# Patient Record
Sex: FEMALE | Race: WHITE | Employment: FULL TIME | URBAN - METROPOLITAN AREA
[De-identification: names, ages, dates, MRNs, and addresses within clinical notes are randomized per-mention and may not be internally consistent; named-entity substitution may affect disease eponyms.]

---

## 2024-02-14 ENCOUNTER — INITIAL PRENATAL (OUTPATIENT)
Dept: OBGYN CLINIC | Age: 34
End: 2024-02-14
Payer: COMMERCIAL

## 2024-02-14 ENCOUNTER — ANCILLARY PROCEDURE (OUTPATIENT)
Dept: OBGYN CLINIC | Age: 34
End: 2024-02-14
Payer: COMMERCIAL

## 2024-02-14 VITALS
WEIGHT: 185 LBS | BODY MASS INDEX: 29.03 KG/M2 | HEIGHT: 67 IN | DIASTOLIC BLOOD PRESSURE: 78 MMHG | OXYGEN SATURATION: 97 % | HEART RATE: 78 BPM | SYSTOLIC BLOOD PRESSURE: 124 MMHG

## 2024-02-14 DIAGNOSIS — Z03.75 SUSPECTED SHORTENING OF CERVIX NOT FOUND: ICD-10-CM

## 2024-02-14 DIAGNOSIS — Z83.2 FAMILY HISTORY OF ITP: ICD-10-CM

## 2024-02-14 DIAGNOSIS — Z36.3 ENCOUNTER FOR ANTENATAL SCREENING FOR MALFORMATION USING ULTRASOUND: Primary | ICD-10-CM

## 2024-02-14 DIAGNOSIS — O99.330 TOBACCO USE DISORDER AFFECTING PREGNANCY, ANTEPARTUM: ICD-10-CM

## 2024-02-14 DIAGNOSIS — E78.2 MIXED HYPERLIPIDEMIA: ICD-10-CM

## 2024-02-14 DIAGNOSIS — O34.10 UTERINE FIBROID DURING PREGNANCY, ANTEPARTUM: ICD-10-CM

## 2024-02-14 DIAGNOSIS — Z34.90 PREGNANCY, UNSPECIFIED GESTATIONAL AGE: ICD-10-CM

## 2024-02-14 DIAGNOSIS — D25.9 UTERINE FIBROID DURING PREGNANCY, ANTEPARTUM: ICD-10-CM

## 2024-02-14 LAB
GLUCOSE URINE, POC: NEGATIVE
PROTEIN UA: POSITIVE

## 2024-02-14 PROCEDURE — 81002 URINALYSIS NONAUTO W/O SCOPE: CPT | Performed by: OBSTETRICS & GYNECOLOGY

## 2024-02-14 PROCEDURE — 76811 OB US DETAILED SNGL FETUS: CPT | Performed by: OBSTETRICS & GYNECOLOGY

## 2024-02-14 PROCEDURE — 99204 OFFICE O/P NEW MOD 45 MIN: CPT | Performed by: OBSTETRICS & GYNECOLOGY

## 2024-02-14 PROCEDURE — 76817 TRANSVAGINAL US OBSTETRIC: CPT | Performed by: OBSTETRICS & GYNECOLOGY

## 2024-02-14 PROCEDURE — 99203 OFFICE O/P NEW LOW 30 MIN: CPT | Performed by: OBSTETRICS & GYNECOLOGY

## 2024-02-14 NOTE — PATIENT INSTRUCTIONS
Please arrive for your scheduled appointment at least 15 minutes early with your actual insurance card+ a photo ID. Also if you need any refills ordered or have questions, it may take up 48 hours to reply. Please allow ample time for your refills. Call me when you use last refill. Thank you for your cooperation. You might be having an NST at your next appt. Please eat a large snack or breakfast before coming to office. Thank youCall your primary obstetrician with bleeding, leaking of fluid, abdominal tenderness, headache, blurry vision, epigastric pain and increased urinary frequency.Any questions contact Khadra at [phone].If you are experiencing an emergency and need immediate help, call 911 or go to go emergency room or labor and delivery. If you are experiencing an emergency and need immediate help, call 911 or go to go emergency room or labor and delivery. If you are experiencing an emergency and need immediate help, call 911 or go to go emergency room or labor and delivery.

## 2024-02-14 NOTE — PROGRESS NOTES
Patient here for prenatal ultrasound  No complaints  + fluttering noted  
blood and blood forming organ     History of ITP     Mixed hyperlipidemia 2022       Past Surgical History:   Procedure Laterality Date    COLONOSCOPY      dr. hendrickson       Allergies   Allergen Reactions    Ibuprofen      Bruising         Current Outpatient Medications:     Prenatal MV-Min-Fe Fum-FA-DHA (PRENATAL 1 PO), Take 1 tablet by mouth daily, Disp: , Rfl:     Social History     Tobacco Use    Smoking status: Every Day     Current packs/day: 0.00     Types: Cigarettes     Last attempt to quit: 2/10/2020     Years since quittin.0    Smokeless tobacco: Never    Tobacco comments:     3,4 cigaretets a day   Substance Use Topics    Alcohol use: Not Currently     Alcohol/week: 0.0 standard drinks of alcohol     Comment: social     FAMILY MEDICAL HISTORY:   Negative for congenital abnormalities, autism, genetic disease and mental retardation, not listed above.     Review of Systems :   CONSTITUTIONAL : No fever, no chills   HEENT : No headache, no visual changes, no rhinorrhea, no sore throat   CARDIOVASCULAR : No pain, no palpitations, no edema   RESPIRATORY : No pain, no shortness of breath   GASTROINTESTINAL : No N/V, no D/C, no abdominal pain   GENITOURINARY : No dysuria, hematuria and no incontinence   MUSCULOSKELETAL : No myalgia, No back pain  NEUROLOGICAL : No numbness, no tingling, no tremors. No history of seizures  ALL OTHER SYSTEMS WERE REPORTED AS NEGATIVE.    PERTINENT PHYSICAL EXAMINATION:   /78 (Position: Sitting)   Pulse 78   Ht 1.702 m (5' 7\")   Wt 83.9 kg (185 lb)   LMP 2023   SpO2 97%   BMI 28.98 kg/m²   Urine dipstick:   Negative for Glucose    Trace for Albumin    GENERAL:   The patient is a well developed, female who is alert cooperative and oriented times three in no acute distress.    HEENT:  Normo cephalic and atraumatic. No facial edema.     ABDOMEN:   Her uterus is gravid. She had no complaint of abdominal pain or tenderness. The fetal heart rate is 153 bpm.

## 2024-04-22 ENCOUNTER — ROUTINE PRENATAL (OUTPATIENT)
Dept: OBGYN CLINIC | Age: 34
End: 2024-04-22
Payer: COMMERCIAL

## 2024-04-22 ENCOUNTER — ANCILLARY PROCEDURE (OUTPATIENT)
Dept: OBGYN CLINIC | Age: 34
End: 2024-04-22
Payer: COMMERCIAL

## 2024-04-22 VITALS
DIASTOLIC BLOOD PRESSURE: 86 MMHG | SYSTOLIC BLOOD PRESSURE: 117 MMHG | BODY MASS INDEX: 29.66 KG/M2 | HEART RATE: 82 BPM | WEIGHT: 189.4 LBS

## 2024-04-22 DIAGNOSIS — D25.9 UTERINE FIBROID DURING PREGNANCY, ANTEPARTUM: ICD-10-CM

## 2024-04-22 DIAGNOSIS — Z3A.29 29 WEEKS GESTATION OF PREGNANCY: Primary | ICD-10-CM

## 2024-04-22 DIAGNOSIS — O26.899 PREGNANCY COMPLICATED BY UMBILICAL CORD VARIX, ANTEPARTUM: ICD-10-CM

## 2024-04-22 DIAGNOSIS — O34.10 UTERINE FIBROID DURING PREGNANCY, ANTEPARTUM: ICD-10-CM

## 2024-04-22 DIAGNOSIS — O99.330 TOBACCO USE DISORDER AFFECTING PREGNANCY, ANTEPARTUM: ICD-10-CM

## 2024-04-22 DIAGNOSIS — Z83.2 FAMILY HISTORY OF ITP: ICD-10-CM

## 2024-04-22 LAB
GLUCOSE URINE, POC: NEGATIVE
PROTEIN UA: POSITIVE

## 2024-04-22 PROCEDURE — 81002 URINALYSIS NONAUTO W/O SCOPE: CPT | Performed by: OBSTETRICS & GYNECOLOGY

## 2024-04-22 PROCEDURE — 76805 OB US >/= 14 WKS SNGL FETUS: CPT | Performed by: OBSTETRICS & GYNECOLOGY

## 2024-04-22 PROCEDURE — 76818 FETAL BIOPHYS PROFILE W/NST: CPT | Performed by: OBSTETRICS & GYNECOLOGY

## 2024-04-22 PROCEDURE — 99213 OFFICE O/P EST LOW 20 MIN: CPT | Performed by: OBSTETRICS & GYNECOLOGY

## 2024-04-22 RX ORDER — FERROUS SULFATE 325(65) MG
1 TABLET ORAL DAILY
COMMUNITY
Start: 2024-04-12

## 2024-04-22 NOTE — PROGRESS NOTES
24    Charles Molina MD  [address] [place] [address]     RE:  NILDA CHAU  : 1990   AGE: 34 y.o.    This report has been created using voice recognition software. It may contain errors which are inherent in voice recognition technology.    Dear Dr. Molina:    Nilda Chau had an appointment today for the following indications:    Patient Active Problem List   Diagnosis    Tobacco use disorder affecting pregnancy, antepartum    Mixed hyperlipidemia    Family history of ITP    Uterine fibroid during pregnancy, antepartum    Pregnancy complicated by umbilical cord varix, antepartum     Nilda Chau is a 34 y.o. female, who is G3(1,0,1,1). She has an Estimated Date of Delivery: 2024 based on her established dates.  She is currently 29 weeks 3 days gestation based on that assessment.     The patient stated that she had ITP as a child.  The available platelet counts in her chart have been within normal limits.  She stated she has not followed with a physician for management for many years.  Her prior medical records were not available for review at the time for assessment.  Her most recent platelet count on 2023 was 212,000.  The lowest platelet count recorded in her chart was 189,000 on 2020.      The American Society of Hematology (CHACE)  indicates that vaginal and  delivery are safe if maternal platelet counts are >50,000. Epidural anesthesia is considered safe if the maternal platelet count is >80,000 based on the recommendations of [place] Committee for Standards in Haematology(BCSH).     The results of Panorama screen were negative. I advised her that this a screening test not a diagnostic test. I advised her that the test screens only for trisomy 21, 18 and 13. We discussed the sensitivity of the test which I advised the patient is as follows:      99.99% for detection of trisomy 21 with a specificity of 99.99%     99.99% for trisomy 18 with a

## 2024-04-22 NOTE — PATIENT INSTRUCTIONS
Please arrive for your scheduled appointment at least 15 minutes early with your actual insurance card+ a photo ID. Also if you need any refills ordered or have questions, it may take up 48 hours to reply. Please allow ample time for your refills. Call me when you use last refill. Thank you for your cooperation. You might be having an NST at your next appt. Please eat a large snack or breakfast before coming to office. Thank youCall your primary obstetrician with bleeding, leaking of fluid, abdominal tenderness, headache, blurry vision, epigastric pain and increased urinary frequency.If you are experiencing an emergency and need immediate help, call 911 or go to go emergency room or labor and delivery. Do kick counts after dinner. Call your primary obstetrician if less than 10 kicks in 2 hours after dinner.     Call your primary obstetrician with bleeding, leaking of fluid, abdominal tenderness, headache, blurry vision, epigastric pain and increased urinary frequency.if you are sick, not feeling well or have an infectious process going on please reschedule your appointment by calling [phone]. Also if any family members are not feeling well, please do not bring them to your appointment. We appreciate your cooperation. We are doing this in order to protect our pregnant mothers+ their babies.if you are sick, not feeling well or have an infectious process going on please reschedule your appointment by calling [phone]. Also if any family members are not feeling well, please do not bring them to your appointment. We appreciate your cooperation. We are doing this in order to protect our pregnant mothers+ their babies.

## 2024-05-20 ENCOUNTER — ROUTINE PRENATAL (OUTPATIENT)
Dept: OBGYN CLINIC | Age: 34
End: 2024-05-20
Payer: COMMERCIAL

## 2024-05-20 ENCOUNTER — ANCILLARY PROCEDURE (OUTPATIENT)
Dept: OBGYN CLINIC | Age: 34
End: 2024-05-20
Payer: COMMERCIAL

## 2024-05-20 VITALS
HEART RATE: 86 BPM | WEIGHT: 189.2 LBS | SYSTOLIC BLOOD PRESSURE: 134 MMHG | DIASTOLIC BLOOD PRESSURE: 85 MMHG | BODY MASS INDEX: 29.63 KG/M2

## 2024-05-20 DIAGNOSIS — Z83.2 FAMILY HISTORY OF ITP: ICD-10-CM

## 2024-05-20 DIAGNOSIS — Z3A.33 33 WEEKS GESTATION OF PREGNANCY: ICD-10-CM

## 2024-05-20 DIAGNOSIS — D25.9 UTERINE FIBROID DURING PREGNANCY, ANTEPARTUM: ICD-10-CM

## 2024-05-20 DIAGNOSIS — O34.10 UTERINE FIBROID DURING PREGNANCY, ANTEPARTUM: ICD-10-CM

## 2024-05-20 DIAGNOSIS — E78.2 MIXED HYPERLIPIDEMIA: ICD-10-CM

## 2024-05-20 DIAGNOSIS — O26.899 PREGNANCY COMPLICATED BY UMBILICAL CORD VARIX, ANTEPARTUM: Primary | ICD-10-CM

## 2024-05-20 LAB
GLUCOSE URINE, POC: NEGATIVE
PROTEIN UA: POSITIVE

## 2024-05-20 PROCEDURE — 81002 URINALYSIS NONAUTO W/O SCOPE: CPT | Performed by: OBSTETRICS & GYNECOLOGY

## 2024-05-20 PROCEDURE — 99213 OFFICE O/P EST LOW 20 MIN: CPT | Performed by: OBSTETRICS & GYNECOLOGY

## 2024-05-20 PROCEDURE — 76805 OB US >/= 14 WKS SNGL FETUS: CPT | Performed by: OBSTETRICS & GYNECOLOGY

## 2024-05-20 PROCEDURE — 76818 FETAL BIOPHYS PROFILE W/NST: CPT | Performed by: OBSTETRICS & GYNECOLOGY

## 2024-05-20 NOTE — PROGRESS NOTES
Nilda Chau presents to office today for US/NST.  Patient denies bleeding, LOF, or cramping.   Positive fetal movement.   C/O sharp jabbing pain in lower left quadrant starting on Friday.  
patient's gestational age.  Visualization of the fetal anatomy was somewhat limited secondary to the fetal position.  There were no apparent gross fetal anatomic abnormalities identified in the areas which were visualized.  A small, anterior intramural uterine myoma was identified.  The BPP and cord Doppler assessments were both reassuring.  There was no evidence of absence, or reversal of end-diastolic flow.        Intraabdominal umbilical varix has been associated with an increased risk of intrauterine growth restriction. As an isolated finding an intraabdominal varix is usually associated with good fetal outcomes.                      GENETIC SCREENING/TERATOLOGY COUNSELING                  (Includes patient, FTB, and any affected family members)    Patient Age > 35 Years NO   Thalassemia ( MVC<80) NO   Congential Heart Defect NO   Neural Tube Defect NO   Crow-Sachs NO   Sickle Cell Disease NO   Sickle Cell Trait NO   Sickle C Disease or Trait NO   Hemophilia NO   Muscular Dystrophy NO   Cystic Fibrosis NO   Joellen Disease NO   Autism NO   Mental Retardation NO   History of Fragile X NO   Maternal Diabetes NO   Other Genetic Disease or Syndrome NO   Previous Child With Congenital Abnormality Not Listed NO   Recreational Drugs NO                                        INFECTION HISTORY     HEPATITIS IMMUNIZED YES   HEPATITIS INFECTION NO   EXPOSURE TO TB NO   GENITAL HERPES  NO   PARVOVIRUS B-19 NO   CHICKEN POX  YES   MEASLES NO   HIV NO     OB History    Para Term  AB Living   3 1 1 0 1 1   SAB IAB Ectopic Molar Multiple Live Births   1 0 0   0 1      # Outcome Date GA Lbr [place]/2nd Weight Sex Delivery Anes PTL Lv   3 Current            2 Term 20 38w5d / 00:28 2.91 kg (6 lb 6.7 oz) F Vag-Spont EPI N JORGE   1 2019 13w0d    SAB         Obstetric Comments        PAST GYNECOLOGICAL  HISTORY:  Negative for abnormal pap smears.   Negative for sexually transmitted diseases.   Negative for

## 2024-05-20 NOTE — PATIENT INSTRUCTIONS
contractions for an hour. This means that you've had at least 6 contractions within 1 hour, even after you change your position and drink fluids.  You notice that your baby has stopped moving or is moving less than normal.  You have signs of heart failure, such as:  New or increased shortness of breath.  New or worse swelling in your legs, ankles, or feet.  Sudden weight gain, such as more than 2 to 3 pounds in a day or 5 pounds in a week.  Feeling so tired or weak that you cannot do your usual activities.  You have symptoms of a urinary tract infection. These may include:  Pain or burning when you urinate.  A frequent need to urinate without being able to pass much urine.  Pain in the flank, which is just below the rib cage and above the waist on either side of the back.  Blood in your urine.  Watch closely for changes in your health, and be sure to contact your doctor if:  You have vaginal discharge that smells bad.  You feel sad, anxious, or hopeless for more than a few days.  You have skin changes, such as a rash, itching, or a yellow color to your skin.  You have other concerns about your pregnancy.  If you have labor signs at 37 weeks or more  If you have signs of labor at 37 weeks or more, your doctor may tell you to call when your labor becomes more active. Symptoms of active labor include:  Contractions that are regular.  Contractions that are less than 5 minutes apart.  Contractions that are hard to talk through.  Follow-up care is a key part of your treatment and safety. Be sure to make and go to all appointments, and call your doctor if you are having problems. It's also a good idea to know your test results and keep a list of the medicines you take.  Where can you learn more?  Go to https://www.healthwise.net/patientEd and enter N531 to learn more about \"Learning About When to Call Your Doctor During Pregnancy (After 20 Weeks).\"  Current as of: July 10, 2023               Content Version: 14.0  ©

## 2024-06-06 ENCOUNTER — ROUTINE PRENATAL (OUTPATIENT)
Dept: OBGYN CLINIC | Age: 34
End: 2024-06-06
Payer: COMMERCIAL

## 2024-06-06 ENCOUNTER — ANCILLARY PROCEDURE (OUTPATIENT)
Dept: OBGYN CLINIC | Age: 34
End: 2024-06-06
Payer: COMMERCIAL

## 2024-06-06 VITALS
DIASTOLIC BLOOD PRESSURE: 81 MMHG | SYSTOLIC BLOOD PRESSURE: 122 MMHG | BODY MASS INDEX: 30.13 KG/M2 | HEART RATE: 91 BPM | WEIGHT: 192.4 LBS

## 2024-06-06 DIAGNOSIS — D25.9 UTERINE FIBROID DURING PREGNANCY, ANTEPARTUM: ICD-10-CM

## 2024-06-06 DIAGNOSIS — O26.899 PREGNANCY COMPLICATED BY UMBILICAL CORD VARIX, ANTEPARTUM: Primary | ICD-10-CM

## 2024-06-06 DIAGNOSIS — O34.10 UTERINE FIBROID DURING PREGNANCY, ANTEPARTUM: ICD-10-CM

## 2024-06-06 DIAGNOSIS — Z3A.35 35 WEEKS GESTATION OF PREGNANCY: ICD-10-CM

## 2024-06-06 DIAGNOSIS — Z83.2 FAMILY HISTORY OF ITP: ICD-10-CM

## 2024-06-06 LAB
GLUCOSE URINE, POC: NEGATIVE
PROTEIN UA: ABNORMAL

## 2024-06-06 PROCEDURE — 99213 OFFICE O/P EST LOW 20 MIN: CPT | Performed by: OBSTETRICS & GYNECOLOGY

## 2024-06-06 PROCEDURE — 76818 FETAL BIOPHYS PROFILE W/NST: CPT | Performed by: OBSTETRICS & GYNECOLOGY

## 2024-06-06 PROCEDURE — 81002 URINALYSIS NONAUTO W/O SCOPE: CPT | Performed by: OBSTETRICS & GYNECOLOGY

## 2024-06-06 NOTE — PROGRESS NOTES
Nilda Chau presents to office today for  Patient denies bleeding, LOF, or cramping.   Positive fetal movement, but feels that it is decreased.     
patient's gestational age.  Visualization of the fetal anatomy was somewhat limited secondary to the fetal position.  There were no apparent gross fetal anatomic abnormalities identified in the areas which were visualized.  A small, anterior intramural uterine myoma was identified.  The BPP and cord Doppler assessments were both reassuring.  There was no evidence of absence, or reversal of end-diastolic flow.        A fetal ultrasound evaluation was performed on 6/6/2024.  A detailed report is included in the EMR under the imaging tab.  A living naylor intrauterine fetus was identified in the cephalic presentation, with normal fetal heart motion and normal fetal motion noted.  The amniotic fluid index was 14.5 cm.  An umbilical cord varix was noted.  The placenta was on the posterior wall of the uterus.  A small, anterior intramural uterine myoma was identified.  The BPP and cord Doppler assessments were both reassuring.  There was no evidence of absence, or reversal of end-diastolic flow.       Intraabdominal umbilical varix has been associated with an increased risk of intrauterine growth restriction. As an isolated finding an intraabdominal varix is usually associated with good fetal outcomes.                      GENETIC SCREENING/TERATOLOGY COUNSELING                  (Includes patient, FTB, and any affected family members)    Patient Age > 35 Years NO   Thalassemia ( MVC<80) NO   Congential Heart Defect NO   Neural Tube Defect NO   Crow-Sachs NO   Sickle Cell Disease NO   Sickle Cell Trait NO   Sickle C Disease or Trait NO   Hemophilia NO   Muscular Dystrophy NO   Cystic Fibrosis NO   Rice Disease NO   Autism NO   Mental Retardation NO   History of Fragile X NO   Maternal Diabetes NO   Other Genetic Disease or Syndrome NO   Previous Child With Congenital Abnormality Not Listed NO   Recreational Drugs NO                                        INFECTION HISTORY     HEPATITIS IMMUNIZED YES   HEPATITIS

## 2024-06-14 ENCOUNTER — ROUTINE PRENATAL (OUTPATIENT)
Dept: OBGYN CLINIC | Age: 34
End: 2024-06-14
Payer: COMMERCIAL

## 2024-06-14 ENCOUNTER — ANCILLARY PROCEDURE (OUTPATIENT)
Dept: OBGYN CLINIC | Age: 34
End: 2024-06-14
Payer: COMMERCIAL

## 2024-06-14 VITALS
SYSTOLIC BLOOD PRESSURE: 114 MMHG | DIASTOLIC BLOOD PRESSURE: 76 MMHG | HEART RATE: 85 BPM | WEIGHT: 191.2 LBS | BODY MASS INDEX: 29.95 KG/M2

## 2024-06-14 DIAGNOSIS — Z3A.37 37 WEEKS GESTATION OF PREGNANCY: ICD-10-CM

## 2024-06-14 DIAGNOSIS — O26.899 PREGNANCY COMPLICATED BY UMBILICAL CORD VARIX, ANTEPARTUM: ICD-10-CM

## 2024-06-14 DIAGNOSIS — Z83.2 FAMILY HISTORY OF ITP: ICD-10-CM

## 2024-06-14 DIAGNOSIS — D25.9 UTERINE FIBROID DURING PREGNANCY, ANTEPARTUM: Primary | ICD-10-CM

## 2024-06-14 DIAGNOSIS — E78.2 MIXED HYPERLIPIDEMIA: ICD-10-CM

## 2024-06-14 DIAGNOSIS — O34.10 UTERINE FIBROID DURING PREGNANCY, ANTEPARTUM: Primary | ICD-10-CM

## 2024-06-14 LAB
GLUCOSE URINE, POC: NEGATIVE
PROTEIN UA: POSITIVE

## 2024-06-14 PROCEDURE — 99213 OFFICE O/P EST LOW 20 MIN: CPT | Performed by: OBSTETRICS & GYNECOLOGY

## 2024-06-14 PROCEDURE — 76816 OB US FOLLOW-UP PER FETUS: CPT | Performed by: OBSTETRICS & GYNECOLOGY

## 2024-06-14 PROCEDURE — 76820 UMBILICAL ARTERY ECHO: CPT | Performed by: OBSTETRICS & GYNECOLOGY

## 2024-06-14 PROCEDURE — 81002 URINALYSIS NONAUTO W/O SCOPE: CPT | Performed by: OBSTETRICS & GYNECOLOGY

## 2024-06-14 PROCEDURE — 76818 FETAL BIOPHYS PROFILE W/NST: CPT | Performed by: OBSTETRICS & GYNECOLOGY

## 2024-06-14 NOTE — PROGRESS NOTES
24    Charles Molina MD  [address] [place] [address]     RE:  NILDA CHAU  : 1990   AGE: 34 y.o.    This report has been created using voice recognition software. It may contain errors which are inherent in voice recognition technology.    Dear Dr. Molina:    Nilda Chau had an appointment today for the following indications:    Patient Active Problem List   Diagnosis    Mixed hyperlipidemia    Family history of ITP    Uterine fibroid during pregnancy, antepartum    Pregnancy complicated by umbilical cord varix, antepartum     Nilda Chau is a 34 y.o. female, who is G3(1,0,1,1). She has an Estimated Date of Delivery: 2024 based on her established dates.  She is currently 37 weeks 0 days gestation based on that assessment.    The patient states that her fetal movement has been good.  She has had no vaginal bleeding or leaking of amniotic fluid.    The patient stated that she had ITP as a child.  The available platelet counts in her chart have been within normal limits.  She stated she has not followed with a physician for management for many years.  Her prior medical records were not available for review at the time for assessment.  Her most recent platelet count on 2023 was 212,000.  The lowest platelet count recorded in her chart was 189,000 on 2020.      The American Society of Hematology (CHACE)  indicates that vaginal and  delivery are safe if maternal platelet counts are >50,000. Epidural anesthesia is considered safe if the maternal platelet count is >80,000 based on the recommendations of [place] citizen Committee for Standards in Haematology(BCSH).     The results of Panorama screen were negative. I advised her that this a screening test not a diagnostic test. I advised her that the test screens only for trisomy 21, 18 and 13. We discussed the sensitivity of the test which I advised the patient is as follows:      99.99% for detection of trisomy 21

## 2024-06-14 NOTE — PATIENT INSTRUCTIONS
Please arrive for your scheduled appointment at least 15 minutes early with your actual insurance card+ a photo ID. Also if you need any refills ordered or have questions, it may take up 48 hours to reply. Please allow ample time for your refills. Call me when you use last refill. Thank you for your cooperation. You might be having an NST at your next appt. Please eat a large snack or breakfast before coming to office. Thank youCall your primary obstetrician with bleeding, leaking of fluid, abdominal tenderness, headache, blurry vision, epigastric pain and increased urinary frequency.If you are experiencing an emergency and need immediate help, call 911 or go to go emergency room or labor and delivery. Do kick counts after dinner. Call your primary obstetrician if less than 10 kicks in 2 hours after dinner.     Call your primary obstetrician with bleeding, leaking of fluid, abdominal tenderness, headache, blurry vision, epigastric pain and increased urinary frequency.if you are sick, not feeling well or have an infectious process going on please reschedule your appointment by calling [phone]. Also if any family members are not feeling well, please do not bring them to your appointment. We appreciate your cooperation. We are doing this in order to protect our pregnant mothers+ their babies.if you are sick, not feeling well or have an infectious process going on please reschedule your appointment by calling [phone]. Also if any family members are not feeling well, please do not bring them to your appointment. We appreciate your cooperation. We are doing this in order to protect our pregnant mothers+ their babies.

## 2024-06-17 ENCOUNTER — HOSPITAL ENCOUNTER (OUTPATIENT)
Age: 34
Discharge: HOME | End: 2024-06-17
Payer: COMMERCIAL

## 2024-06-17 DIAGNOSIS — Z83.2 FAMILY HISTORY OF ITP: ICD-10-CM

## 2024-06-17 LAB
BASOPHILS # BLD: 0.02 K/UL (ref 0–0.2)
BASOPHILS NFR BLD: 0 % (ref 0–2)
EOSINOPHIL # BLD: 0.11 K/UL (ref 0.05–0.5)
EOSINOPHILS RELATIVE PERCENT: 1 % (ref 0–6)
ERYTHROCYTE [DISTWIDTH] IN BLOOD BY AUTOMATED COUNT: 16.3 % (ref 11.5–15)
HCT VFR BLD AUTO: 31.4 % (ref 34–48)
HGB BLD-MCNC: 9.9 G/DL (ref 11.5–15.5)
IMM GRANULOCYTES # BLD AUTO: 0.12 K/UL (ref 0–0.58)
IMM GRANULOCYTES NFR BLD: 1 % (ref 0–5)
LYMPHOCYTES NFR BLD: 1.72 K/UL (ref 1.5–4)
LYMPHOCYTES RELATIVE PERCENT: 16 % (ref 20–42)
MCH RBC QN AUTO: 27 PG (ref 26–35)
MCHC RBC AUTO-ENTMCNC: 31.5 G/DL (ref 32–34.5)
MCV RBC AUTO: 85.6 FL (ref 80–99.9)
MONOCYTES NFR BLD: 0.93 K/UL (ref 0.1–0.95)
MONOCYTES NFR BLD: 8 % (ref 2–12)
NEUTROPHILS NFR BLD: 74 % (ref 43–80)
NEUTS SEG NFR BLD: 8.19 K/UL (ref 1.8–7.3)
PLATELET # BLD AUTO: 202 K/UL (ref 130–450)
PMV BLD AUTO: 12.5 FL (ref 7–12)
RBC # BLD AUTO: 3.67 M/UL (ref 3.5–5.5)
WBC OTHER # BLD: 11.1 K/UL (ref 4.5–11.5)

## 2024-06-17 PROCEDURE — 85025 COMPLETE CBC W/AUTO DIFF WBC: CPT

## 2024-06-17 PROCEDURE — 36415 COLL VENOUS BLD VENIPUNCTURE: CPT

## 2024-06-20 ENCOUNTER — ANCILLARY PROCEDURE (OUTPATIENT)
Dept: OBGYN CLINIC | Age: 34
End: 2024-06-20
Payer: COMMERCIAL

## 2024-06-20 ENCOUNTER — ROUTINE PRENATAL (OUTPATIENT)
Dept: OBGYN CLINIC | Age: 34
End: 2024-06-20
Payer: COMMERCIAL

## 2024-06-20 VITALS
DIASTOLIC BLOOD PRESSURE: 86 MMHG | BODY MASS INDEX: 30.24 KG/M2 | HEART RATE: 87 BPM | WEIGHT: 193.1 LBS | SYSTOLIC BLOOD PRESSURE: 130 MMHG

## 2024-06-20 DIAGNOSIS — O26.899 PREGNANCY COMPLICATED BY UMBILICAL CORD VARIX, ANTEPARTUM: Primary | ICD-10-CM

## 2024-06-20 DIAGNOSIS — D25.9 UTERINE FIBROID DURING PREGNANCY, ANTEPARTUM: ICD-10-CM

## 2024-06-20 DIAGNOSIS — O34.10 UTERINE FIBROID DURING PREGNANCY, ANTEPARTUM: ICD-10-CM

## 2024-06-20 DIAGNOSIS — Z3A.37 37 WEEKS GESTATION OF PREGNANCY: ICD-10-CM

## 2024-06-20 DIAGNOSIS — Z83.2 FAMILY HISTORY OF ITP: ICD-10-CM

## 2024-06-20 LAB
GLUCOSE URINE, POC: NORMAL
PROTEIN UA: POSITIVE

## 2024-06-20 PROCEDURE — 99213 OFFICE O/P EST LOW 20 MIN: CPT | Performed by: OBSTETRICS & GYNECOLOGY

## 2024-06-20 PROCEDURE — 76818 FETAL BIOPHYS PROFILE W/NST: CPT | Performed by: OBSTETRICS & GYNECOLOGY

## 2024-06-20 PROCEDURE — 81002 URINALYSIS NONAUTO W/O SCOPE: CPT | Performed by: OBSTETRICS & GYNECOLOGY

## 2024-06-20 PROCEDURE — 76820 UMBILICAL ARTERY ECHO: CPT | Performed by: OBSTETRICS & GYNECOLOGY

## 2024-06-20 NOTE — PROGRESS NOTES
Nilda Chau presents to office today for BPP/NST.  Patient denies bleeding, LOF, or cramping.   Positive fetal movement.     
and normal fetal motion noted.  The amniotic fluid index was 8.3 cm.  An umbilical cord varix was again noted.  The placenta was on the posterior wall of the uterus.  The BPP and cord Doppler assessments were both reassuring.  There was no evidence of absence or reversal of end-diastolic flow.       Intraabdominal umbilical varix has been associated with an increased risk of intrauterine growth restriction. As an isolated finding an intraabdominal varix is usually associated with good fetal outcomes.                      GENETIC SCREENING/TERATOLOGY COUNSELING                  (Includes patient, FTB, and any affected family members)    Patient Age > 35 Years NO   Thalassemia ( MVC<80) NO   Congential Heart Defect NO   Neural Tube Defect NO   Crow-Sachs NO   Sickle Cell Disease NO   Sickle Cell Trait NO   Sickle C Disease or Trait NO   Hemophilia NO   Muscular Dystrophy NO   Cystic Fibrosis NO   Foster Disease NO   Autism NO   Mental Retardation NO   History of Fragile X NO   Maternal Diabetes NO   Other Genetic Disease or Syndrome NO   Previous Child With Congenital Abnormality Not Listed NO   Recreational Drugs NO                                        INFECTION HISTORY     HEPATITIS IMMUNIZED YES   HEPATITIS INFECTION NO   EXPOSURE TO TB NO   GENITAL HERPES  NO   PARVOVIRUS B-19 NO   CHICKEN POX  YES   MEASLES NO   HIV NO     OB History    Para Term  AB Living   3 1 1 0 1 1   SAB IAB Ectopic Molar Multiple Live Births   1 0 0   0 1      # Outcome Date GA Lbr [place]/2nd Weight Sex Delivery Anes PTL Lv   3 Current            2 Term 20 38w5d / 00:28 2.91 kg (6 lb 6.7 oz) F Vag-Spont EPI N JORGE   1 2019 13w0d    SAB         Obstetric Comments        PAST GYNECOLOGICAL  HISTORY:  Negative for abnormal pap smears.   Negative for sexually transmitted diseases.   Negative for cervical LEEP / conization /cryosurgery.    Negative for uterine surgery.   Negative for ovarian or tubal surgery.     Past

## 2024-06-20 NOTE — PATIENT INSTRUCTIONS
are having problems. It's also a good idea to know your test results and keep a list of the medicines you take.  Where can you learn more?  Go to https://www.[employer].net/patientEd and enter N531 to learn more about \"Learning About When to Call Your Doctor During Pregnancy (After 20 Weeks).\"  Current as of: July 10, 2023  Content Version: 14.1  © 2006-2024 [employer].   Care instructions adapted under license by [employer]. If you have questions about a medical condition or this instruction, always ask your healthcare professional. [employer] disclaims any warranty or liability for your use of this information.

## 2024-06-27 ENCOUNTER — ANCILLARY PROCEDURE (OUTPATIENT)
Dept: OBGYN CLINIC | Age: 34
End: 2024-06-27
Payer: COMMERCIAL

## 2024-06-27 ENCOUNTER — ROUTINE PRENATAL (OUTPATIENT)
Dept: OBGYN CLINIC | Age: 34
End: 2024-06-27
Payer: COMMERCIAL

## 2024-06-27 VITALS
WEIGHT: 191.8 LBS | DIASTOLIC BLOOD PRESSURE: 72 MMHG | HEART RATE: 93 BPM | SYSTOLIC BLOOD PRESSURE: 108 MMHG | BODY MASS INDEX: 30.04 KG/M2

## 2024-06-27 DIAGNOSIS — O34.10 UTERINE FIBROID DURING PREGNANCY, ANTEPARTUM: ICD-10-CM

## 2024-06-27 DIAGNOSIS — Z83.2 FAMILY HISTORY OF ITP: ICD-10-CM

## 2024-06-27 DIAGNOSIS — E78.2 MIXED HYPERLIPIDEMIA: ICD-10-CM

## 2024-06-27 DIAGNOSIS — Z3A.38 38 WEEKS GESTATION OF PREGNANCY: ICD-10-CM

## 2024-06-27 DIAGNOSIS — O26.899 PREGNANCY COMPLICATED BY UMBILICAL CORD VARIX, ANTEPARTUM: Primary | ICD-10-CM

## 2024-06-27 DIAGNOSIS — D25.9 UTERINE FIBROID DURING PREGNANCY, ANTEPARTUM: ICD-10-CM

## 2024-06-27 LAB
GLUCOSE URINE, POC: NEGATIVE
PROTEIN UA: POSITIVE

## 2024-06-27 PROCEDURE — 99213 OFFICE O/P EST LOW 20 MIN: CPT | Performed by: OBSTETRICS & GYNECOLOGY

## 2024-06-27 PROCEDURE — 76820 UMBILICAL ARTERY ECHO: CPT | Performed by: OBSTETRICS & GYNECOLOGY

## 2024-06-27 PROCEDURE — 76818 FETAL BIOPHYS PROFILE W/NST: CPT | Performed by: OBSTETRICS & GYNECOLOGY

## 2024-06-27 PROCEDURE — 81002 URINALYSIS NONAUTO W/O SCOPE: CPT | Performed by: OBSTETRICS & GYNECOLOGY

## 2024-06-27 NOTE — PROGRESS NOTES
Patient is here for bpp/nst. Patient denies any vaginal bleeding, leakage of fluid or cramping. Patient has good fetal movement.    
available records, results of all previously ordered testing/procedures, and current problem list  Counseling/educating the patient  Coordinating care with other healthcare professionals  Communicating results to the patient's family/caregiver  Documenting clinical information in the patient's electronic health record   I answered all of her questions to her satisfaction.   I asked her to call if she had any additional questions.       Available paper and electronic medical records were reviewed.  I reviewed with the patient the results of her fetal ultrasound evaluation performed today including the limitations of the testing.  We discussed her prior history of ITP. Her platelet count on 6/17/2024 was 202,000 which is within normal limits.  I discussed with her my recommendations for ongoing evaluation and management through the balance of her pregnancy.  These recommendations may change depending on the patient's clinical presentation and the results of her testing.  I answered all the patient's questions to her satisfaction.  I asked her to call if she had any additional questions.    If you have any questions regarding her management, please contact me at your convenience and thank you for allowing me to participate in her care.    Sincerely,        Nathan Carmona MD, MS, FACOG, FACS, RDCS, RDMS, RVT  Director Maternal-Fetal Medicine  [facility]  [phone]

## 2024-06-28 ENCOUNTER — HOSPITAL ENCOUNTER (INPATIENT)
Age: 34
LOS: 1 days | Discharge: HOME | End: 2024-06-29
Attending: OBSTETRICS & GYNECOLOGY | Admitting: OBSTETRICS & GYNECOLOGY
Payer: COMMERCIAL

## 2024-06-28 ENCOUNTER — ANESTHESIA EVENT (OUTPATIENT)
Dept: LABOR AND DELIVERY | Age: 34
End: 2024-06-28
Payer: COMMERCIAL

## 2024-06-28 ENCOUNTER — ANESTHESIA (OUTPATIENT)
Dept: LABOR AND DELIVERY | Age: 34
End: 2024-06-28
Payer: COMMERCIAL

## 2024-06-28 PROBLEM — D50.9 IRON DEFICIENCY ANEMIA OF PREGNANCY: Status: ACTIVE | Noted: 2024-06-28

## 2024-06-28 PROBLEM — O99.019 IRON DEFICIENCY ANEMIA OF PREGNANCY: Status: ACTIVE | Noted: 2024-06-28

## 2024-06-28 PROBLEM — Z3A.39 39 WEEKS GESTATION OF PREGNANCY: Status: ACTIVE | Noted: 2024-06-28

## 2024-06-28 PROBLEM — Z37.9 NORMAL LABOR: Status: ACTIVE | Noted: 2024-06-28

## 2024-06-28 LAB
ABO + RH BLD: NORMAL
AMNISURE, POC: POSITIVE
AMPHET UR QL SCN: NEGATIVE
ARM BAND NUMBER: NORMAL
BARBITURATES UR QL SCN: NEGATIVE
BASOPHILS # BLD: 0.03 K/UL (ref 0–0.2)
BASOPHILS NFR BLD: 0 % (ref 0–2)
BENZODIAZ UR QL: NEGATIVE
BLOOD BANK SAMPLE EXPIRATION: NORMAL
BLOOD GROUP ANTIBODIES SERPL: NEGATIVE
BUPRENORPHINE UR QL: NEGATIVE
CANNABINOIDS UR QL SCN: NEGATIVE
COCAINE UR QL SCN: NEGATIVE
EOSINOPHIL # BLD: 0.07 K/UL (ref 0.05–0.5)
EOSINOPHILS RELATIVE PERCENT: 1 % (ref 0–6)
ERYTHROCYTE [DISTWIDTH] IN BLOOD BY AUTOMATED COUNT: 16.9 % (ref 11.5–15)
FENTANYL UR QL: NEGATIVE
HCT VFR BLD AUTO: 32.1 % (ref 34–48)
HGB BLD-MCNC: 10.5 G/DL (ref 11.5–15.5)
IMM GRANULOCYTES # BLD AUTO: 0.25 K/UL (ref 0–0.58)
IMM GRANULOCYTES NFR BLD: 2 % (ref 0–5)
LYMPHOCYTES NFR BLD: 2.28 K/UL (ref 1.5–4)
LYMPHOCYTES RELATIVE PERCENT: 15 % (ref 20–42)
Lab: NORMAL
MCH RBC QN AUTO: 27.6 PG (ref 26–35)
MCHC RBC AUTO-ENTMCNC: 32.7 G/DL (ref 32–34.5)
MCV RBC AUTO: 84.3 FL (ref 80–99.9)
METHADONE UR QL: NEGATIVE
MONOCYTES NFR BLD: 1.26 K/UL (ref 0.1–0.95)
MONOCYTES NFR BLD: 9 % (ref 2–12)
NEGATIVE QC PASS/FAIL: NORMAL
NEUTROPHILS NFR BLD: 74 % (ref 43–80)
NEUTS SEG NFR BLD: 10.88 K/UL (ref 1.8–7.3)
OPIATES UR QL SCN: NEGATIVE
OXYCODONE UR QL SCN: NEGATIVE
PCP UR QL SCN: NEGATIVE
PLATELET # BLD AUTO: 180 K/UL (ref 130–450)
PMV BLD AUTO: 12.7 FL (ref 7–12)
POSITIVE QC PASS/FAIL: NORMAL
RBC # BLD AUTO: 3.81 M/UL (ref 3.5–5.5)
TEST INFORMATION: NORMAL
WBC OTHER # BLD: 14.8 K/UL (ref 4.5–11.5)

## 2024-06-28 PROCEDURE — 80307 DRUG TEST PRSMV CHEM ANLYZR: CPT

## 2024-06-28 PROCEDURE — 86901 BLOOD TYPING SEROLOGIC RH(D): CPT

## 2024-06-28 PROCEDURE — 6370000000 HC RX 637 (ALT 250 FOR IP): Performed by: OBSTETRICS & GYNECOLOGY

## 2024-06-28 PROCEDURE — 7200000001 HC VAGINAL DELIVERY

## 2024-06-28 PROCEDURE — 1220000000 HC SEMI PRIVATE OB R&B

## 2024-06-28 PROCEDURE — 51701 INSERT BLADDER CATHETER: CPT

## 2024-06-28 PROCEDURE — 2500000003 HC RX 250 WO HCPCS

## 2024-06-28 PROCEDURE — 6370000000 HC RX 637 (ALT 250 FOR IP)

## 2024-06-28 PROCEDURE — 86850 RBC ANTIBODY SCREEN: CPT

## 2024-06-28 PROCEDURE — 2500000003 HC RX 250 WO HCPCS: Performed by: ANESTHESIOLOGY

## 2024-06-28 PROCEDURE — 85025 COMPLETE CBC W/AUTO DIFF WBC: CPT

## 2024-06-28 PROCEDURE — 3E033VJ INTRODUCTION OF OTHER HORMONE INTO PERIPHERAL VEIN, PERCUTANEOUS APPROACH: ICD-10-PCS | Performed by: OBSTETRICS & GYNECOLOGY

## 2024-06-28 PROCEDURE — 86900 BLOOD TYPING SEROLOGIC ABO: CPT

## 2024-06-28 PROCEDURE — 0WQNXZZ REPAIR FEMALE PERINEUM, EXTERNAL APPROACH: ICD-10-PCS | Performed by: OBSTETRICS & GYNECOLOGY

## 2024-06-28 PROCEDURE — 99232 SBSQ HOSP IP/OBS MODERATE 35: CPT | Performed by: MIDWIFE

## 2024-06-28 RX ORDER — MODIFIED LANOLIN
OINTMENT (GRAM) TOPICAL PRN
Status: DISCONTINUED | OUTPATIENT
Start: 2024-06-28 | End: 2024-06-29 | Stop reason: HOSPADM

## 2024-06-28 RX ORDER — ONDANSETRON 2 MG/ML
4 INJECTION INTRAMUSCULAR; INTRAVENOUS EVERY 6 HOURS PRN
Status: DISCONTINUED | OUTPATIENT
Start: 2024-06-28 | End: 2024-06-28 | Stop reason: SDUPTHER

## 2024-06-28 RX ORDER — ACETAMINOPHEN 500 MG
1000 TABLET ORAL EVERY 6 HOURS PRN
Status: DISCONTINUED | OUTPATIENT
Start: 2024-06-28 | End: 2024-06-29 | Stop reason: HOSPADM

## 2024-06-28 RX ORDER — NALOXONE HYDROCHLORIDE 0.4 MG/ML
INJECTION, SOLUTION INTRAMUSCULAR; INTRAVENOUS; SUBCUTANEOUS PRN
Status: DISCONTINUED | OUTPATIENT
Start: 2024-06-28 | End: 2024-06-28

## 2024-06-28 RX ORDER — SODIUM CHLORIDE, SODIUM LACTATE, POTASSIUM CHLORIDE, AND CALCIUM CHLORIDE .6; .31; .03; .02 G/100ML; G/100ML; G/100ML; G/100ML
500 INJECTION, SOLUTION INTRAVENOUS PRN
Status: DISCONTINUED | OUTPATIENT
Start: 2024-06-28 | End: 2024-06-28

## 2024-06-28 RX ORDER — ONDANSETRON 2 MG/ML
4 INJECTION INTRAMUSCULAR; INTRAVENOUS EVERY 6 HOURS PRN
Status: DISCONTINUED | OUTPATIENT
Start: 2024-06-28 | End: 2024-06-28

## 2024-06-28 RX ORDER — SODIUM CHLORIDE, SODIUM LACTATE, POTASSIUM CHLORIDE, CALCIUM CHLORIDE 600; 310; 30; 20 MG/100ML; MG/100ML; MG/100ML; MG/100ML
INJECTION, SOLUTION INTRAVENOUS CONTINUOUS
Status: DISCONTINUED | OUTPATIENT
Start: 2024-06-28 | End: 2024-06-28

## 2024-06-28 RX ORDER — ACETAMINOPHEN 650 MG
TABLET, EXTENDED RELEASE ORAL
Status: COMPLETED
Start: 2024-06-28 | End: 2024-06-28

## 2024-06-28 RX ORDER — TRANEXAMIC ACID 10 MG/ML
1000 INJECTION, SOLUTION INTRAVENOUS
Status: DISCONTINUED | OUTPATIENT
Start: 2024-06-28 | End: 2024-06-28

## 2024-06-28 RX ORDER — LIDOCAINE HYDROCHLORIDE AND EPINEPHRINE 15; 5 MG/ML; UG/ML
INJECTION, SOLUTION EPIDURAL PRN
Status: DISCONTINUED | OUTPATIENT
Start: 2024-06-28 | End: 2024-06-28 | Stop reason: SDUPTHER

## 2024-06-28 RX ORDER — LIDOCAINE HYDROCHLORIDE 10 MG/ML
INJECTION, SOLUTION INFILTRATION; PERINEURAL
Status: DISCONTINUED
Start: 2024-06-28 | End: 2024-06-28

## 2024-06-28 RX ORDER — DOCUSATE SODIUM 100 MG/1
CAPSULE, LIQUID FILLED ORAL
Status: COMPLETED
Start: 2024-06-28 | End: 2024-06-28

## 2024-06-28 RX ORDER — TERBUTALINE SULFATE 1 MG/ML
0.25 INJECTION, SOLUTION SUBCUTANEOUS
Status: DISCONTINUED | OUTPATIENT
Start: 2024-06-28 | End: 2024-06-28

## 2024-06-28 RX ORDER — METHYLERGONOVINE MALEATE 0.2 MG/ML
200 INJECTION INTRAVENOUS PRN
Status: DISCONTINUED | OUTPATIENT
Start: 2024-06-28 | End: 2024-06-28

## 2024-06-28 RX ORDER — CARBOPROST TROMETHAMINE 250 UG/ML
250 INJECTION, SOLUTION INTRAMUSCULAR PRN
Status: DISCONTINUED | OUTPATIENT
Start: 2024-06-28 | End: 2024-06-28

## 2024-06-28 RX ORDER — MISOPROSTOL 200 UG/1
400 TABLET ORAL PRN
Status: DISCONTINUED | OUTPATIENT
Start: 2024-06-28 | End: 2024-06-28

## 2024-06-28 RX ORDER — FERROUS SULFATE 325(65) MG
325 TABLET ORAL
Status: DISCONTINUED | OUTPATIENT
Start: 2024-06-29 | End: 2024-06-29 | Stop reason: HOSPADM

## 2024-06-28 RX ORDER — DOCUSATE SODIUM 100 MG/1
100 CAPSULE, LIQUID FILLED ORAL 2 TIMES DAILY
Status: DISCONTINUED | OUTPATIENT
Start: 2024-06-28 | End: 2024-06-29 | Stop reason: HOSPADM

## 2024-06-28 RX ORDER — ONDANSETRON 4 MG/1
4 TABLET, ORALLY DISINTEGRATING ORAL EVERY 6 HOURS PRN
Status: DISCONTINUED | OUTPATIENT
Start: 2024-06-28 | End: 2024-06-28

## 2024-06-28 RX ORDER — PRENATAL WITH FERROUS FUM AND FOLIC ACID 3080; 920; 120; 400; 22; 1.84; 3; 20; 10; 1; 12; 200; 27; 25; 2 [IU]/1; [IU]/1; MG/1; [IU]/1; MG/1; MG/1; MG/1; MG/1; MG/1; MG/1; UG/1; MG/1; MG/1; MG/1; MG/1
1 TABLET ORAL
Status: DISCONTINUED | OUTPATIENT
Start: 2024-06-29 | End: 2024-06-29 | Stop reason: HOSPADM

## 2024-06-28 RX ADMIN — DOCUSATE SODIUM 100 MG: 100 CAPSULE, LIQUID FILLED ORAL at 20:55

## 2024-06-28 RX ADMIN — Medication: at 14:00

## 2024-06-28 RX ADMIN — Medication 166.7 ML: at 14:13

## 2024-06-28 RX ADMIN — Medication: at 19:14

## 2024-06-28 RX ADMIN — Medication 15 ML/HR: at 06:52

## 2024-06-28 RX ADMIN — LIDOCAINE HYDROCHLORIDE,EPINEPHRINE BITARTRATE 1.5 ML: 15; .005 INJECTION, SOLUTION EPIDURAL; INFILTRATION; INTRACAUDAL; PERINEURAL at 06:52

## 2024-06-28 RX ADMIN — Medication: at 19:15

## 2024-06-28 ASSESSMENT — PAIN SCALES - GENERAL: PAINLEVEL_OUTOF10: 2

## 2024-06-28 ASSESSMENT — LIFESTYLE VARIABLES: SMOKING_STATUS: 1

## 2024-06-28 ASSESSMENT — PAIN DESCRIPTION - LOCATION: LOCATION: PERINEUM

## 2024-06-28 NOTE — ANESTHESIA PRE PROCEDURE
in 10 mL sodium chloride syringe   IntraVENous PRN Reji Bernard T, DO        ondansetron (ZOFRAN) injection 4 mg  4 mg IntraVENous Q6H PRN Reji Bernard T, DO        fentaNYL 1.85mcg/ml and Bupivicaine 0.1% in 0.9% NS 135ml infusion (OB) epidural  15 mL/hr Epidural Continuous Reji Bernard T, DO           Allergies:    Allergies   Allergen Reactions    Ibuprofen      Bruising         Problem List:    Patient Active Problem List   Diagnosis Code    Mixed hyperlipidemia E78.2    Family history of ITP Z83.2    Uterine fibroid during pregnancy, antepartum O34.10, D25.9    Pregnancy complicated by umbilical cord varix, antepartum O26.899    39 weeks gestation of pregnancy Z3A.39       Past Medical History:        Diagnosis Date    Abnormal Pap smear of cervix     biopsy only, normal since    Disease of blood and blood forming organ     History of ITP     Mixed hyperlipidemia 2022       Past Surgical History:        Procedure Laterality Date    COLONOSCOPY      dr. hendrickson       Social History:    Social History     Tobacco Use    Smoking status: Every Day     Current packs/day: 0.00     Types: Cigarettes     Last attempt to quit: 2/10/2020     Years since quittin.3    Smokeless tobacco: Never    Tobacco comments:     3,4 cigaretets a day   Substance Use Topics    Alcohol use: Not Currently     Comment: social                                Ready to quit: Not Answered  Counseling given: Not Answered  Tobacco comments: 3,4 cigaretets a day      Vital Signs (Current):   Vitals:    24 0526   BP: 128/86   Pulse: 82   Resp: 16   Temp: 36.7 °C (98 °F)   SpO2: 100%                                              BP Readings from Last 3 Encounters:   24 128/86   24 108/72   24 130/86       NPO Status:                                                                                 BMI:   Wt Readings from Last 3 Encounters:   24 87 kg (191 lb 12.8 oz)   24 87.6 kg (193 lb 1.6

## 2024-06-28 NOTE — H&P
Department of Obstetrics and Gynecology  Labor and Delivery  History & Physical    Patient:  Nilda Chau     Admit Date:  2024  4:57 AM  Medical Record Number:  [id number]    CHIEF COMPLAINT:  Leakage of fluid and contractions    PROBLEM LIST:     Patient Active Problem List   Diagnosis    Mixed hyperlipidemia    Family history of ITP    Uterine fibroid during pregnancy, antepartum    Pregnancy complicated by umbilical cord varix, antepartum           HISTORY OF PRESENT ILLNESS:    The patient is a 34 y.o.  female  at 39w0d.  Patient presents with a chief complaint as above and is being admitted for     ESTIMATED DUE DATE: Estimated Date of Delivery: 24    PRENATAL CARE:  Complicated by:    Mixed hyperlipidemia    Family history of ITP    Uterine fibroid during pregnancy, antepartum    Pregnancy complicated by umbilical cord varix, antepartum     GBS: negative    Past OB History  OB History          3    Para   1    Term   1       0    AB   1    Living   1         SAB   1    IAB   0    Ectopic   0    Molar        Multiple   0    Live Births   1          Obstetric Comments                    Past Medical History:        Diagnosis Date    Abnormal Pap smear of cervix     biopsy only, normal since    Disease of blood and blood forming organ     History of ITP     Mixed hyperlipidemia 2022       Past Surgical History:        Procedure Laterality Date    COLONOSCOPY      dr. hendrickson       Allergies:  Ibuprofen    Social History:    Social History     Socioeconomic History    Marital status:      Spouse name: Not on file    Number of children: Not on file    Years of education: Not on file    Highest education level: Not on file   Occupational History    Not on file   Tobacco Use    Smoking status: Every Day     Current packs/day: 0.00     Types: Cigarettes     Last attempt to quit: 2/10/2020     Years since quittin.3    Smokeless tobacco: Never    Tobacco comments:

## 2024-06-28 NOTE — L&D DELIVERY NOTE
presentation via  @ 1407, under epidural anesthesia anesthesia,  over an intact perineum with a resulting 1st degree laceration, without dystocia or complication, a Live Born Male infant, weighing 6# 12oz, 3050 grams, Clear fluid at delivery, bulb suctioned on perineum. A nuchal cord was not present.  A delayed cord clamping was performed.  Spontaneous cry,  Apgar's 1 minute:  9; 5 minute:  9. The placenta was delivered with gentle traction and was noted to be intact, whole, and that the umbilical cord had three vessels noted. Episiotomy was not needed due to a spontaneous 1st degree (mucosa only) vaginal laceration.  Repair performed in layers, per routine with  Vicryl 3-0 suture.  Cervix, rectum, sphincter intact. Sponge, needle, and instrument counts correct x 2.    Delivery Summary:  Blade Chau [[id number]]      Labor Events     Labor: No   Steroids: None  Cervical Ripening Date/Time:      Antibiotics Received during Labor: No  Rupture Identifier: Sac 1  Rupture Date/Time:  24 03:00:00   Rupture Type: SROM  Fluid Color: Clear  Fluid Odor: None  Fluid Volume: Moderate  Augmentation: Oxytocin  Labor Complications: None    Anesthesia    Method: Epidural    Labor Event Times      Labor onset date/time:  24 01:30:00     Dilation complete date/time:  24 13:45:00     Start pushing date/time:  2024 13:50:00     Labor Length    1st stage: 12h 15m  2nd stage: 0h 22m  3rd stage: 0h 06m    Delivery Details      Delivery Date: 24 Delivery Time: 14:07:00   Delivery Type: Vaginal, Spontaneous    [place] Presentation    Presentation: Vertex  Position: Left  _: Occiput  _: Anterior    Shoulder Dystocia    Shoulder Dystocia Present?: No    Assisted Delivery Details    Forceps Attempted?: No  Vacuum Extractor Attempted?: No    Cord    Vessels: 3 Vessels  Complications: None  Delayed Cord Clamping?: Yes  Cord Clamped Date/Time: 2024 14:08:00  Cord Blood Disposition: Lab  Gases  performed the procedure.    Tom Ambrosio MD MD, FACOG  6/28/2024 2:43 PM

## 2024-06-28 NOTE — ANESTHESIA PROCEDURE NOTES
CSE Block    Patient location during procedure: OB  Start time: 6/28/2024 6:50 AM  End time: 6/28/2024 6:52 AM  Reason for block: labor epidural  Staffing  Performed: other anesthesia staff   Anesthesiologist: Luci Hudson DO  Resident/CRNA: Samantha Pickens APRN - CRNA  Other anesthesia staff: Minh Hall RN  Performed by: Minh Hall RN  Authorized by: Luci Hudson DO    CSE  Patient position: sitting  Prep: ChloraPrep  Patient monitoring: continuous pulse ox and frequent blood pressure checks  Approach: midline  Provider prep: mask and sterile gloves  Spinal Needle  Needle type: pencil-tip   Needle gauge: 27 G  Needle length: 5 inch.  Epidural Needle  Injection technique: JONAH air  Needle type: Tuohy   Needle gauge: 18 G  Needle length: 3.5 in  Needle insertion depth: 6 cm  Location: lumbar (1-5)  Catheter  Catheter type: end hole  Catheter size: 20g.  Catheter at skin depth: 12 cm  Assessment  Hemodynamics: stable  Preanesthetic Checklist  Completed: patient identified, IV checked, site marked, risks and benefits discussed, surgical/procedural consents, equipment checked, pre-op evaluation, timeout performed, anesthesia consent given, oxygen available, monitors applied/VS acknowledged, fire risk safety assessment completed and verbalized and blood product R/B/A discussed and consented

## 2024-06-28 NOTE — PROGRESS NOTES
Pt , 39.0 of Dr. Molina presented to labor and delivery with complaints of contractions and LOF. Patient denies vaginal bleeding, headaches and blurry vision. Patient reports positive fetal movement. Amnisure taken and resulted positive. EFM applied and physician notified. Call light within reach.    No

## 2024-06-28 NOTE — LACTATION NOTE
Introduced myself to patient. Reviewed previous breastfeeding experience along with goals for breastfeeding. Encouraged latching baby every 2-3 hr, and encouraged a lot of skin to skin. Assisted with latching baby on right side. No success latching, but mom was able to express several large drops of colostrum into baby's mouth. Mom requests an electric breast pump for home to increase milk supply. Declined breastfeeding education, provided breastfeeding booklet. Lactation number on board. Encouraged to call with any questions or concerns she may have.     Tenisha Richmond, CLS

## 2024-06-29 VITALS
TEMPERATURE: 97.7 F | RESPIRATION RATE: 16 BRPM | HEART RATE: 73 BPM | SYSTOLIC BLOOD PRESSURE: 108 MMHG | OXYGEN SATURATION: 97 % | DIASTOLIC BLOOD PRESSURE: 55 MMHG

## 2024-06-29 PROBLEM — D25.9 UTERINE FIBROID DURING PREGNANCY, ANTEPARTUM: Status: RESOLVED | Noted: 2024-02-14 | Resolved: 2024-06-29

## 2024-06-29 PROBLEM — Z3A.39 39 WEEKS GESTATION OF PREGNANCY: Status: RESOLVED | Noted: 2024-06-28 | Resolved: 2024-06-29

## 2024-06-29 PROBLEM — O26.899 PREGNANCY COMPLICATED BY UMBILICAL CORD VARIX, ANTEPARTUM: Status: RESOLVED | Noted: 2024-04-22 | Resolved: 2024-06-29

## 2024-06-29 PROBLEM — Z37.9 NORMAL LABOR: Status: RESOLVED | Noted: 2024-06-28 | Resolved: 2024-06-29

## 2024-06-29 PROBLEM — O34.10 UTERINE FIBROID DURING PREGNANCY, ANTEPARTUM: Status: RESOLVED | Noted: 2024-02-14 | Resolved: 2024-06-29

## 2024-06-29 PROCEDURE — 6370000000 HC RX 637 (ALT 250 FOR IP): Performed by: OBSTETRICS & GYNECOLOGY

## 2024-06-29 RX ORDER — ACETAMINOPHEN 500 MG
1000 TABLET ORAL EVERY 6 HOURS PRN
Refills: 0 | COMMUNITY
Start: 2024-06-29

## 2024-06-29 RX ORDER — PSEUDOEPHEDRINE HCL 30 MG
100 TABLET ORAL 2 TIMES DAILY PRN
COMMUNITY
Start: 2024-06-29

## 2024-06-29 RX ORDER — MODIFIED LANOLIN
1 OINTMENT (GRAM) TOPICAL PRN
COMMUNITY
Start: 2024-06-29

## 2024-06-29 RX ADMIN — ACETAMINOPHEN 1000 MG: 500 TABLET ORAL at 06:48

## 2024-06-29 RX ADMIN — PRENATAL WITH FERROUS FUM AND FOLIC ACID 1 TABLET: 3080; 920; 120; 400; 22; 1.84; 3; 20; 10; 1; 12; 200; 27; 25; 2 TABLET ORAL at 09:36

## 2024-06-29 ASSESSMENT — PAIN SCALES - GENERAL: PAINLEVEL_OUTOF10: 5

## 2024-06-29 ASSESSMENT — PAIN DESCRIPTION - ORIENTATION: ORIENTATION: LOWER;MID

## 2024-06-29 ASSESSMENT — PAIN DESCRIPTION - LOCATION: LOCATION: ABDOMEN

## 2024-06-29 ASSESSMENT — PAIN - FUNCTIONAL ASSESSMENT: PAIN_FUNCTIONAL_ASSESSMENT: ACTIVITIES ARE NOT PREVENTED

## 2024-06-29 ASSESSMENT — PAIN DESCRIPTION - DESCRIPTORS: DESCRIPTORS: CRAMPING

## 2024-06-29 NOTE — PROGRESS NOTES
[employer] [place] Hearing screening results were discussed with parent. Infant did not pass hearing screening and will need further evaluation outpatient.   Questions answered. Brochure given to parent. Advised to monitor developmental milestones and contact physician for any concerns.   Discussed the importance of follow up and reviewed date and time ( if scheduling with [facility]) and / or provided a list of other locations should they wish to have baby tested elsewhere.     Reviewed with patient we do not always have reminder calls and to please add this to their calendars.  Reviewed to bring baby as calm as possible and let baby stay sleeping if asleep upon arrival to appointment.   Parent acknowledged all and understood directions. No further questions at this time but provided office # if they think of anything.     Carina Fowler, AuD

## 2024-06-29 NOTE — FLOWSHEET NOTE
Instructed patient on discharge care. Verbal understanding given and questions answered.  Discharge videos watched. Denies further needs.

## 2024-06-29 NOTE — FLOWSHEET NOTE
Discharged home. Taken out in wheelchair with belongings and discharge information. Infant in stable condition with mother.

## 2024-06-29 NOTE — LACTATION NOTE
Mom reports baby has been struggling to latch, giving formula at this time. Tips provided and sustain latch at the breast. Encouraged frequent feeds at breast, hand expression and skin to skin to establish supply. Support provided and encouraged to call with any needs.

## 2024-06-29 NOTE — DISCHARGE INSTRUCTIONS
Follow-up with your OB doctor in 6 weeks for vaginal delivery unless otherwise instructed.   Call office for an appointment.    For breastfeeding support, you can contact our lactation specialists at [phone] or [phone]    DIET  Eat a well balanced diet focusing on foods high in fiber and protein  Drink plenty of fluids especially water.  To avoid constipation you may take a mild stool softener as recommended by your doctor or midwife.    ACTIVITY  Gradually increase your activity.  Resume exercise regimen only after advised by your doctor or midwife.  Avoid lifting anything heavier than your baby or a gallon of milk for SIX weeks.   Avoid driving until your doctor or midwife has given their approval.  Rise slowly from a lying to sitting and then a standing position.  Climb stairs one at a time.  Use caution when carrying your baby up and down the stairs.  No sexual activity for 6 weeks or until advised by your doctor - Nothing in vagina: intercourse, tampons, or douching.   Be prepared to discuss family planning at your follow-up OB visit.   You may feel tired or have a lack of energy.  You may continue your prenatal vitamin to replenish nutrients post delivery.  Nap when baby naps to catch up on sleep.  May return to work or school in 6 weeks or as directed by OB.     EMOTIONS  You may feed stovall, sad, teary, & overwhelmed.  Contact your OB provider if you feel you may be showing signs of postpartum depression, or have thoughts of harming yourself or your infant.  If infant will not stop crying, contact another adult for help or place infant in their crib on their back and take a break.  NEVER shake your infant.      BLEEDING  Vaginal bleeding will decrease in amount over the next few weeks.  You will notice that as your activity increases, your flow may increase.  This is your body's way of telling you, you need to take things easier and rest more often.  Call your OB/ER if you are saturating more than

## 2024-06-29 NOTE — ANESTHESIA POSTPROCEDURE EVALUATION
Department of Anesthesiology  Postprocedure Note    Patient: Nilda Chau  MRN: [id number]  YOB: 1990  Date of evaluation: 6/29/2024    Procedure Summary       Date: 06/28/24 Room / Location:     Anesthesia Start: 0645 Anesthesia Stop: 1407    Procedure: Labor Analgesia Diagnosis:     Scheduled Providers:  Responsible Provider: Luci Hudson DO    Anesthesia Type: epidural, spinal, general ASA Status: 2            Anesthesia Type: No value filed.    [place]igue Phase I:      Migue Phase II:      Anesthesia Post Evaluation    Patient location during evaluation: bedside  Patient participation: complete - patient participated  Level of consciousness: awake and alert  Pain score: 0  Airway patency: patent  Nausea & Vomiting: no nausea and no vomiting  Cardiovascular status: blood pressure returned to baseline  Respiratory status: acceptable  Hydration status: euvolemic  Pain management: adequate        No notable events documented.

## 2024-06-29 NOTE — DISCHARGE SUMMARY
Department of Obstetrics and Gynecology  Labor and Delivery  Discharge Summary    Patient:  Nilda Chau         Medical Record Number:  [id number]    Admit Date:  2024  4:57 AM    Discharge Date: 2024    Final Diagnosis:   Principal Problem (Resolved):    39 weeks gestation of pregnancy  Active Problems:     (normal spontaneous vaginal delivery)    Term birth of  male    First degree laceration of perineum during delivery, postpartum    Iron deficiency anemia of pregnancy  Resolved Problems:    Normal labor    Uterine fibroid during pregnancy, antepartum    Pregnancy complicated by umbilical cord varix, antepartum      Chief Complaint - Presenting Illness - Physical Findings:   39 weeks gestation of pregnancy [Z3A.39]  HPI: 34 y.o. W female  at 39w0d with a history of an umbilical varix, a small intramural uterine fibroid and iron deficiency anemia who was admitted for active phase labor, frances every 2-3 minutes, 5 cm dilated.    Hospital Course:   Delivery Summary:  Procedure Date: 2024 2:43 PM      Pre-delivery Diagnosis: Multigravida IUP at 39 weeks, spontaneous onset normal labor, nuchal cord varix, small intramural fibroid, iron deficiency anemia pregnancy, family history of ITP      Patient Active Problem List   Diagnosis    Mixed hyperlipidemia    Family history of ITP    Uterine fibroid during pregnancy, antepartum    Pregnancy complicated by umbilical cord varix, antepartum    39 weeks gestation of pregnancy    Iron deficiency anemia of pregnancy    Normal labor      Post-delivery Diagnosis:  Living  infant Male, first-degree laceration      Patient Active Problem List   Diagnosis    Mixed hyperlipidemia    Family history of ITP    Uterine fibroid during pregnancy, antepartum    Pregnancy complicated by umbilical cord varix, antepartum    39 weeks gestation of pregnancy    Iron deficiency anemia of pregnancy    Normal labor     (normal spontaneous vaginal  Time: 06/28/24  6:15 AM   Result Value Ref Range    Amphetamine Screen, Ur NEGATIVE NEGATIVE    Barbiturate Screen, Ur NEGATIVE NEGATIVE    Cocaine Metabolite, Urine NEGATIVE NEGATIVE    Benzodiazepine Screen, Urine NEGATIVE NEGATIVE    Buprenorphine Urine NEGATIVE NEGATIVE    Methadone Screen, Urine NEGATIVE NEGATIVE    Opiates, Urine NEGATIVE NEGATIVE    Phencyclidine, Urine NEGATIVE NEGATIVE    Cannabinoid Scrn, Ur NEGATIVE NEGATIVE    Fentanyl, Ur NEGATIVE NEGATIVE    Oxycodone Screen, Ur NEGATIVE NEGATIVE    Test Information       These drug screen results are for medical purposes only and should not be considered definitive or confirmed.   TYPE AND SCREEN    Collection Time: 06/28/24  6:15 AM   Result Value Ref Range    Blood Bank Sample Expiration 07/01/2024,2359     Arm Band Number [id number]     ABO/Rh O POSITIVE     Antibody Screen NEGATIVE    CBC with Auto Differential    Collection Time: 06/28/24  6:15 AM   Result Value Ref Range    WBC 14.8 (H) 4.5 - 11.5 k/uL    RBC 3.81 3.50 - 5.50 m/uL    Hemoglobin 10.5 (L) 11.5 - 15.5 g/dL    Hematocrit 32.1 (L) 34.0 - 48.0 %    MCV 84.3 80.0 - 99.9 fL    MCH 27.6 26.0 - 35.0 pg    MCHC 32.7 32.0 - 34.5 g/dL    RDW 16.9 (H) 11.5 - 15.0 %    Platelets 180 130 - 450 k/uL    MPV 12.7 (H) 7.0 - 12.0 fL    Neutrophils % 74 43.0 - 80.0 %    Lymphocytes % 15 (L) 20.0 - 42.0 %    Monocytes % 9 2.0 - 12.0 %    Eosinophils % 1 0 - 6 %    Basophils % 0 0.0 - 2.0 %    Immature Granulocytes % 2 0.0 - 5.0 %    Neutrophils Absolute 10.88 (H) 1.80 - 7.30 k/uL    Lymphocytes Absolute 2.28 1.50 - 4.00 k/uL    Monocytes Absolute 1.26 (H) 0.10 - 0.95 k/uL    Eosinophils Absolute 0.07 0.05 - 0.50 k/uL    Basophils Absolute 0.03 0.00 - 0.20 k/uL    Immature Granulocytes Absolute 0.25 0.00 - 0.58 k/uL         Physicians Following Patient During Hospitalization - Reason For Care:  Admitting Physician: Tracy  Delivering Physician: Daily  Rounding Physician(s):    PP#1

## 2024-06-29 NOTE — DISCHARGE INSTR - ACTIVITY
After Your Delivery Discharge Instructions    What to do after you leave the hospital:    Recommended diet: regular diet  Recommended activity: No driving for 1 weeks, no sex or tampon use for 6 weeks. No douching.  No heavy lifting (greater than baby and carrier) for 6 weeks.  Initially, avoid frequent ambulation with stairs - start slowly then increase as tolerated.  You may return to work in 6 weeks.  Showers are fine - avoid bath tubs, hot tubs, swimming.  Follow-up in 6 weeks for final postpartum visit.    Call the Physician with any of these signs and symptoms:    Warning signs regarding stitches:  \"Popping\" of stitches  Foul smelling discharge or pus  More redness or streaks around stitches than before    Perineum / episiotomy care:  Continue care as in the hospital (sitz baths, squirt bottle, etc.)  No tub baths until OK'd by your Physician , showers are fine     After your delivery - signs and symptoms to watch for:  Fever - Oral temperature greater than 100.4 degrees Fahrenheit  Foul-smelling vaginal discharge  Headache unrelieved by \"pain meds\"  Difficulty urinating  Breasts reddened, hard, hot to the touch  Nipple discharge which is foul-smelling or contains pus  Increased pain at the site of the laceration repair  Difficulty breathing with or without chest pain  New calf pain especially if only on one side  Sudden, continuing increased vaginal bleeding (heavier than a period) with or without clots  Unrelieved feelings of:  Inability to cope  Sadness  Anxiety  Lack of interest in baby  Insomnia  Crying     What to do at home:  Resume activity gradually   Don't lift anything heavier than baby and carrier until OK'd by your Physician   No sex until OK'd by your Physician  Eat regular nutritious meals  Take pain medication as prescribed whenever you need them  To avoid/relieve constipation take stool softeners if advised   Increase fiber in your diet    Most importantly ENJOY your Baby!  - Dr. ESPOSITO

## 2024-06-29 NOTE — DISCHARGE INSTR - DIET

## 2024-12-04 PROCEDURE — 88304 TISSUE EXAM BY PATHOLOGIST: CPT | Performed by: DERMATOLOGY

## 2024-12-05 ENCOUNTER — LAB REQUISITION (OUTPATIENT)
Dept: DERMATOPATHOLOGY | Facility: CLINIC | Age: 34
End: 2024-12-05
Payer: COMMERCIAL

## 2024-12-05 DIAGNOSIS — D17.0 BENIGN LIPOMATOUS NEOPLASM OF SKIN AND SUBCUTANEOUS TISSUE OF HEAD, FACE AND NECK: ICD-10-CM

## 2024-12-10 LAB
LABORATORY COMMENT REPORT: NORMAL
PATH REPORT.FINAL DX SPEC: NORMAL
PATH REPORT.GROSS SPEC: NORMAL
PATH REPORT.RELEVANT HX SPEC: NORMAL
PATH REPORT.TOTAL CANCER: NORMAL

## 2025-04-28 ENCOUNTER — HOSPITAL ENCOUNTER (EMERGENCY)
Age: 35
Discharge: HOME | End: 2025-04-28
Payer: COMMERCIAL

## 2025-04-28 ENCOUNTER — APPOINTMENT (OUTPATIENT)
Dept: GENERAL RADIOLOGY | Age: 35
End: 2025-04-28
Payer: COMMERCIAL

## 2025-04-28 VITALS
RESPIRATION RATE: 14 BRPM | DIASTOLIC BLOOD PRESSURE: 77 MMHG | WEIGHT: 175 LBS | BODY MASS INDEX: 27.47 KG/M2 | OXYGEN SATURATION: 99 % | HEART RATE: 82 BPM | TEMPERATURE: 98 F | HEIGHT: 67 IN | SYSTOLIC BLOOD PRESSURE: 126 MMHG

## 2025-04-28 DIAGNOSIS — H10.32 ACUTE CONJUNCTIVITIS OF LEFT EYE, UNSPECIFIED ACUTE CONJUNCTIVITIS TYPE: Primary | ICD-10-CM

## 2025-04-28 LAB
FLUAV RNA RESP QL NAA+PROBE: NOT DETECTED
FLUBV RNA RESP QL NAA+PROBE: NOT DETECTED
SARS-COV-2 RNA RESP QL NAA+PROBE: NOT DETECTED
SOURCE: NORMAL
SPECIMEN DESCRIPTION: NORMAL
SPECIMEN SOURCE: NORMAL
STREP A, MOLECULAR: NEGATIVE

## 2025-04-28 PROCEDURE — 87636 SARSCOV2 & INF A&B AMP PRB: CPT

## 2025-04-28 PROCEDURE — 71045 X-RAY EXAM CHEST 1 VIEW: CPT

## 2025-04-28 PROCEDURE — 99284 EMERGENCY DEPT VISIT MOD MDM: CPT

## 2025-04-28 PROCEDURE — 87651 STREP A DNA AMP PROBE: CPT

## 2025-04-28 RX ORDER — BROMPHENIRAMINE MALEATE, PSEUDOEPHEDRINE HYDROCHLORIDE, AND DEXTROMETHORPHAN HYDROBROMIDE 2; 30; 10 MG/5ML; MG/5ML; MG/5ML
5 SYRUP ORAL 4 TIMES DAILY PRN
Qty: 118 ML | Refills: 0 | Status: SHIPPED | OUTPATIENT
Start: 2025-04-28

## 2025-04-28 RX ORDER — CETIRIZINE HYDROCHLORIDE 10 MG/1
10 TABLET ORAL DAILY
Qty: 30 TABLET | Refills: 0 | Status: SHIPPED | OUTPATIENT
Start: 2025-04-28

## 2025-04-28 RX ORDER — AZITHROMYCIN 250 MG/1
TABLET, FILM COATED ORAL
Qty: 1 PACKET | Refills: 0 | Status: SHIPPED | OUTPATIENT
Start: 2025-04-28 | End: 2025-05-08

## 2025-04-28 RX ORDER — ERYTHROMYCIN 5 MG/G
OINTMENT OPHTHALMIC
Qty: 3.5 G | Refills: 0 | Status: SHIPPED | OUTPATIENT
Start: 2025-04-28 | End: 2025-05-08

## 2025-04-28 ASSESSMENT — PAIN - FUNCTIONAL ASSESSMENT: PAIN_FUNCTIONAL_ASSESSMENT: NONE - DENIES PAIN

## 2025-04-28 ASSESSMENT — VISUAL ACUITY: OU: 1

## 2025-04-28 NOTE — ED PROVIDER NOTES
Independent [facility] Visit.       [facility] EMERGENCY DEPARTMENT  EMERGENCY DEPARTMENT ENCOUNTER      Pt Name: Nilda Chau  MRN: [id number]  Birthdate 1990  Date of evaluation: 4/28/2025  Provider: CHAVA Blackmon CNP  4:32 PM    CHIEF COMPLAINT       Chief Complaint   Patient presents with    Cold Symptoms     Thinks its allergies, woke up with sore throat and gunk coming out of eyes         HISTORY OF PRESENT ILLNESS    Nilda Chau is a 35 y.o. female who presents to the emergency department for URI symptoms.  Patient states that for the last several days she has had Cough and sinus and chest congestion sore throat.  She also states that today she noticed yellow and green discharge from her left eye.patient denies any eye injury denies any blurred vision or vision changes.  Patient states that she has had some sick contacts at home with her children she states that she is a 10-month-old and a 4-year-old.  She is wants to make sure that she is not going to get them sick and would like tested for flu and strep.  Patient also states that she is a smoker has been coughing up some yellow-green phlegm over the course of the last several days.  She is concerned for pneumonia as well.  Patient states that she has taken some over-the-counter medications without much relief.    The history is provided by the patient.       Nursing Notes were reviewed.    REVIEW OF SYSTEMS       Review of Systems   All other systems reviewed and are negative.      Except as noted above the remainder of the review of systems was reviewed and negative.       PAST MEDICAL HISTORY     Past Medical History:   Diagnosis Date    Abnormal Pap smear of cervix     biopsy only, normal since    Disease of blood and blood forming organ     History of ITP     Mixed hyperlipidemia 04/21/2022         SURGICAL HISTORY       Past Surgical History:   Procedure Laterality Date    COLONOSCOPY  2015    dr. hendrickson         CURRENT